# Patient Record
Sex: MALE | Race: WHITE | Employment: UNEMPLOYED | URBAN - METROPOLITAN AREA
[De-identification: names, ages, dates, MRNs, and addresses within clinical notes are randomized per-mention and may not be internally consistent; named-entity substitution may affect disease eponyms.]

---
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
## 2023-12-14 ENCOUNTER — OFFICE VISIT (OUTPATIENT)
Dept: PEDIATRIC GASTROENTEROLOGY | Facility: CLINIC | Age: 18
End: 2023-12-14
Payer: COMMERCIAL

## 2023-12-14 VITALS
RESPIRATION RATE: 18 BRPM | BODY MASS INDEX: 28.72 KG/M2 | DIASTOLIC BLOOD PRESSURE: 85 MMHG | HEART RATE: 82 BPM | SYSTOLIC BLOOD PRESSURE: 145 MMHG | HEIGHT: 70 IN | WEIGHT: 200.62 LBS

## 2023-12-14 DIAGNOSIS — K59.09 CONSTIPATION, CHRONIC: ICD-10-CM

## 2023-12-14 DIAGNOSIS — K58.1 IRRITABLE BOWEL SYNDROME WITH CONSTIPATION: Primary | ICD-10-CM

## 2023-12-14 PROCEDURE — 99213 OFFICE O/P EST LOW 20 MIN: CPT | Performed by: PEDIATRICS

## 2023-12-14 NOTE — PATIENT INSTRUCTIONS
Thank you for visiting us and it was great pleasure meeting you in the GI clinic.   - .     I have the following suggestions recommendations as detailed during our meeting.     Recommendations:  Diet - regular   Medications - miralax 1 cap as needed   Follow up - transition to primary care/adult GI    Patient Communication:   Please use [employer] for communicating with us. The is the preferred and quick way of communication.    To sign up, our  can help you or the web URL is [email or web address]. If you have issues signing up, please call [employer] at . If you do not prefer to use [employer], please let us know. For follow up and other clinic appointments: Our  can schedule. You could also schedule via [employer] or please call at  or     General Instructions (may not be pertinent for your child):   For Xray, scan and other imaging are ordered: Schedule via [employer] or please call Radiology at   Labs: You can walk in to your nearest  lab during working hours (no appointments is required)   Medication refills: Please send us a message via [employer] at least a week before the medication supplies end   If Endoscopy is ordered: You will get a phone call to schedule.   Other non-urgent queries: Please Message via [employer]. Please allow us 3 working days to respond to your questions. For emergent and urgent concerns, please seek help appropriately     Dr.Senthil Phill Haque MD, FAAP, D-NBPNS, D-ABOM   Division of Pediatric Gastroenterology, Hepatology & Nutrition  [facility]  , [employer].   Phone: [phone]     Fax: [phone]        - Ms.Lenore Mcfarlane ([email or web address])  Nurse - Ms.Sam De Souza, RN, BSN, CPN

## 2023-12-14 NOTE — PROGRESS NOTES
"Pediatric Gastroenterology Follow Up Office Visit    Brian Elizalde was seen for the follow up for c/o constipation  Accompanied by none  History obtained from the patient. and prior medical records were reviewed for this encounter.     History of Present Illness:   Since the last visit, patient has been having intermittent constipation with large caliber stools.     Other Associated Upper GI symptoms:  Nocturnal symptoms - none  Abdominal pain - left sided with BMs  Vomiting - none  Dysphagia - none  Choking/cough with feeds - none  GERD symptoms - none    Bowel movements:  Frequency - almost every day   Straining with stools - noted   Pain associated with stooling - noted   Blood with stools - none    Social History: lives with family, no secondhand smoke exposure, attends school   Family history (among first degree relatives) pertaining to the GI system    ROS negative for General, Eyes, ENT, Cardiovascular, , Ortho, Derm, Neuro, Psych, Lymph unless noted in the HPI above.   Immunization: up to date    Anthropometrics:  Wt Readings from Last 3 Encounters:   12/14/23 91 kg (200 lb 9.9 oz)   07/13/23 93 kg (205 lb)   03/23/23 87.5 kg (192 lb 14.4 oz)     Weight: 91 kg (200 lb 9.9 oz)  Length/Ht: 1.771 m (5' 9.72\")  Wt/Lth or BMI/Age: Body mass index is 29.01 kg/m².    Vital signs : /85   Pulse 82   Resp 18   Ht 1.771 m (5' 9.72\")   Wt 91 kg (200 lb 9.9 oz)   BMI 29.01 kg/m²  [unfilled] [unfilled] @[facility]@  [facility] age limit for growth %dipak is 20 years.    PHYSICAL EXAMINATION:  General appearance: healthy, no distress,  Skin: Skin color, texture, turgor normal. No rashes or lesions.  Head: Normocephalic. No masses, lesions, tenderness or abnormalities  Eyes: conjunctivae not injected /corneas clear. EOM's intact.  Ears: no discharge noted  Nose/Sinuses: Nares normal. Septum midline. Mucosa normal. No drainage or sinus tenderness.  Oropharynx: Lips, mucosa, and tongue normal. Oropharynx normal  Neck: " Neck supple. No adenopathy.   Lungs: Good breath sounds; no rales or rhonchi.  Heart: Regular rate and rhythm. Normal S1 and S2. No murmurs, clicks or gallops.  Abdomen: Abdomen soft, non-tender. BS normal. No masses, No organomegaly  Neuro: Gross motor and sensory testing normal    IMPRESSION & RECOMMENDATIONS/PLAN: Brian Elizalde is a 18 y.o. old who presents for consultation to the Pediatric Gastroenterology clinic today for evaluation and management of  irritable bowel syndrome-constipation type. He has intermittent constipation and I detailed liberal intake of water, high fiber diet and miralax 17 gm (1 cap) daily/as needed.  Also detailed about transition to adult GI vs continuing with primary care if miralax helps. Also discussed the possibility of linzess daily.     Recommendations:   Diet - regular   Medications - miralax 1 cap as needed   Follow up - transition to primary care/adult GI    Phill Molina MD ()  Division of Pediatric Gastroenterology, Hepatology and Nutrition  [facility]  Phone: [phone]     Fax: [phone]  GI Nurse - Ms.Sam De Souza MSN, RN, CPN   - Ms.Lenore Mcfarlane       (ps - This note was created using voice recognition software. I have made every reasonable attempts to avoid incorrect errors, but this document may contain errors not identified before proof reading and finalizing the document. If the errors change the accuracy of the document, I would appreciate being brought to my attention. Thanks)

## 2024-07-17 ENCOUNTER — HOSPITAL ENCOUNTER (OUTPATIENT)
Dept: GENERAL RADIOLOGY | Age: 19
Discharge: HOME | End: 2024-07-19
Payer: COMMERCIAL

## 2024-07-17 ENCOUNTER — HOSPITAL ENCOUNTER (OUTPATIENT)
Age: 19
Discharge: HOME | End: 2024-07-19
Payer: COMMERCIAL

## 2024-07-17 DIAGNOSIS — R10.2 PELVIC PAIN IN MALE: ICD-10-CM

## 2024-07-17 DIAGNOSIS — M25.551 RIGHT HIP PAIN: ICD-10-CM

## 2024-07-17 PROCEDURE — 72170 X-RAY EXAM OF PELVIS: CPT
